# Patient Record
Sex: MALE | Race: BLACK OR AFRICAN AMERICAN | NOT HISPANIC OR LATINO | Employment: STUDENT | URBAN - METROPOLITAN AREA
[De-identification: names, ages, dates, MRNs, and addresses within clinical notes are randomized per-mention and may not be internally consistent; named-entity substitution may affect disease eponyms.]

---

## 2020-05-07 ENCOUNTER — TELEPHONE (OUTPATIENT)
Dept: SPORTS MEDICINE | Facility: CLINIC | Age: 20
End: 2020-05-07

## 2020-05-07 NOTE — TELEPHONE ENCOUNTER
I called and left a message a the phone number provided in the previous message encouraging the patient's mother to return the call.

## 2020-05-07 NOTE — TELEPHONE ENCOUNTER
----- Message from Lyndsay Wu MA sent at 5/5/2020  2:30 PM CDT -----  Contact: pt mother  See below. Patient was seen by Dr Nina over 5 years ago for his hip.   ----- Message -----  From: Argentina Sharpe  Sent: 5/5/2020  11:09 AM CDT  To: [facility] Staff    Please call pt mother at [phone]    Patient mother has questions regarding patient needing a future hip surgery by an outside provider    Thank you

## 2020-05-15 ENCOUNTER — TELEPHONE (OUTPATIENT)
Dept: ORTHOPEDICS | Facility: CLINIC | Age: 20
End: 2020-05-15

## 2020-05-15 NOTE — TELEPHONE ENCOUNTER
After speaking with Dr. Nina I informed the patient's family that he would recommend Dr. Ortiz. A message was sent to his staff and they will reach out to schedule.

## 2020-05-19 ENCOUNTER — TELEPHONE (OUTPATIENT)
Dept: ORTHOPEDICS | Facility: CLINIC | Age: 20
End: 2020-05-19

## 2020-05-19 NOTE — TELEPHONE ENCOUNTER
"I called the patient's mom today regarding their appointment. I left a message for the patient's mom to call me back. I left my name and phone number.        ----- Message from Eboni Lara MA sent at 5/15/2020  4:42 PM CDT -----  Thanks so much, you can contact the patient's mother (Constant) at [phone].     Eboni Lara   Clinical assistant to Dr. Inocencia Nina    ----- Message -----  From: Papo Dietz MA  Sent: 5/15/2020   4:23 PM CDT  To: Eboni Lara MA    Good afternoon,     We typically do not see any "new" medicaid patients. Since he is being referred over from Dr. Nina, we will get him in to see Dr. Ortiz.     Thank you for the referral.     Sincerely,   Kyaw Dietz   ----- Message -----  From: Eboni Lara MA  Sent: 5/15/2020   2:48 PM CDT  To: [facility] Staff    Dr. Nina has a patient that he previously treated that is being told that he needs a hip replacement. The patient is a 20 year old with medicaid. Does Dr. Ortiz do total hip replacement? Would he see a medicaid patient? Thank you for your time.     Eboni Lara   Clinical assistant to Dr. Inocencia Nina          "